# Patient Record
Sex: FEMALE | Race: WHITE | NOT HISPANIC OR LATINO | URBAN - METROPOLITAN AREA
[De-identification: names, ages, dates, MRNs, and addresses within clinical notes are randomized per-mention and may not be internally consistent; named-entity substitution may affect disease eponyms.]

---

## 2023-04-18 ENCOUNTER — APPOINTMENT (OUTPATIENT)
Dept: URBAN - METROPOLITAN AREA CLINIC 204 | Age: 38
Setting detail: DERMATOLOGY
End: 2023-04-18

## 2023-04-18 DIAGNOSIS — L85.3 XEROSIS CUTIS: ICD-10-CM

## 2023-04-18 DIAGNOSIS — L72.0 EPIDERMAL CYST: ICD-10-CM

## 2023-04-18 PROCEDURE — 99203 OFFICE O/P NEW LOW 30 MIN: CPT

## 2023-04-18 PROCEDURE — OTHER ADDITIONAL NOTES: OTHER

## 2023-04-18 PROCEDURE — OTHER MIPS QUALITY: OTHER

## 2023-04-18 PROCEDURE — OTHER MONITORING: OTHER

## 2023-04-18 PROCEDURE — OTHER COUNSELING: OTHER

## 2023-04-18 ASSESSMENT — LOCATION DETAILED DESCRIPTION DERM
LOCATION DETAILED: RIGHT SUPERIOR CENTRAL MALAR CHEEK
LOCATION DETAILED: LEFT SUPERIOR MEDIAL MALAR CHEEK
LOCATION DETAILED: LEFT SUPERIOR CENTRAL MALAR CHEEK
LOCATION DETAILED: RIGHT CENTRAL MALAR CHEEK

## 2023-04-18 ASSESSMENT — LOCATION SIMPLE DESCRIPTION DERM
LOCATION SIMPLE: RIGHT CHEEK
LOCATION SIMPLE: LEFT CHEEK

## 2023-04-18 ASSESSMENT — LOCATION ZONE DERM: LOCATION ZONE: FACE

## 2023-04-18 NOTE — HPI: RASH (ECZEMA)
How Severe Is Your Eczema?: mild
Is This A New Presentation, Or A Follow-Up?: Rash
Additional History: Patient uses Derma-E on face. Patient states flesh colored spots under eyes.

## 2023-04-18 NOTE — PROCEDURE: ADDITIONAL NOTES
Render Risk Assessment In Note?: no
Detail Level: Simple
Additional Notes: Continue to use moisturizer; talked about plastics Dr Schneider for consult there.

## 2023-12-10 ENCOUNTER — HOSPITAL ENCOUNTER (EMERGENCY)
Facility: HOSPITAL | Age: 38
Discharge: HOME | End: 2023-12-10
Attending: EMERGENCY MEDICINE
Payer: COMMERCIAL

## 2023-12-10 VITALS
HEART RATE: 98 BPM | RESPIRATION RATE: 18 BRPM | DIASTOLIC BLOOD PRESSURE: 88 MMHG | BODY MASS INDEX: 34.53 KG/M2 | WEIGHT: 220 LBS | TEMPERATURE: 98.6 F | OXYGEN SATURATION: 98 % | SYSTOLIC BLOOD PRESSURE: 105 MMHG | HEIGHT: 67 IN

## 2023-12-10 DIAGNOSIS — M79.673 PAIN OF FOOT, UNSPECIFIED LATERALITY: Primary | ICD-10-CM

## 2023-12-10 PROCEDURE — 99283 EMERGENCY DEPT VISIT LOW MDM: CPT | Performed by: EMERGENCY MEDICINE

## 2023-12-10 RX ORDER — ETODOLAC 400 MG/1
400 TABLET, FILM COATED ORAL 2 TIMES DAILY
Qty: 14 TABLET | Refills: 0 | Status: SHIPPED | OUTPATIENT
Start: 2023-12-10 | End: 2023-12-17

## 2023-12-10 ASSESSMENT — COLUMBIA-SUICIDE SEVERITY RATING SCALE - C-SSRS
1. IN THE PAST MONTH, HAVE YOU WISHED YOU WERE DEAD OR WISHED YOU COULD GO TO SLEEP AND NOT WAKE UP?: NO
6. HAVE YOU EVER DONE ANYTHING, STARTED TO DO ANYTHING, OR PREPARED TO DO ANYTHING TO END YOUR LIFE?: NO
2. HAVE YOU ACTUALLY HAD ANY THOUGHTS OF KILLING YOURSELF?: NO
6. HAVE YOU EVER DONE ANYTHING, STARTED TO DO ANYTHING, OR PREPARED TO DO ANYTHING TO END YOUR LIFE?: NO

## 2023-12-10 ASSESSMENT — PAIN DESCRIPTION - LOCATION: LOCATION: FOOT

## 2023-12-10 ASSESSMENT — PAIN - FUNCTIONAL ASSESSMENT: PAIN_FUNCTIONAL_ASSESSMENT: 0-10

## 2023-12-10 ASSESSMENT — PAIN SCALES - GENERAL: PAINLEVEL_OUTOF10: 6

## 2023-12-10 ASSESSMENT — PAIN DESCRIPTION - ORIENTATION: ORIENTATION: LEFT

## 2023-12-10 NOTE — ED PROVIDER NOTES
"HPI   Chief Complaint   Patient presents with    Foot Injury     Left foot pain unknown injury started on Friday.  Reports \"hard area\" in arch of foot.        Patient presents to the emergency department secondary to left foot pain.  She is complaining of pain over the inside arch of her left foot.  She states that \"I been doing a lot of driving around town and I think that aggravated it\".  She is concerned that she may have a blood clot in her foot.  She is also reporting proximal left calf pain.  There is no history of any injury of any kind.      History provided by:  Patient   used: No                        [place] Coma Scale Score: 15                  Patient History   Past Medical History:   Diagnosis Date    Personal history of other diseases of the digestive system     History of esophageal ulcer     Past Surgical History:   Procedure Laterality Date    OTHER SURGICAL HISTORY  01/04/2023    No history of surgery     No family history on file.  Social History     Tobacco Use    Smoking status: Former     Types: Cigarettes    Smokeless tobacco: Never   Substance Use Topics    Alcohol use: Not Currently    Drug use: Not Currently       Physical Exam   ED Triage Vitals [12/10/23 0844]   Temp Heart Rate Resp BP   37 °C (98.6 °F) 98 18 105/88      SpO2 Temp src Heart Rate Source Patient Position   98 % -- -- --      BP Location FiO2 (%)     -- --       Physical Exam  Vitals and nursing note reviewed.   Constitutional:       General: She is not in acute distress.     Appearance: Normal appearance. She is normal weight. She is not ill-appearing, toxic-appearing or diaphoretic.   HENT:      Head: Normocephalic and atraumatic.      Nose: Nose normal. No rhinorrhea.   Neck:      Comments: Trachea is midline  Cardiovascular:      Comments: Peripheral pulses are equal and palpable in the bilateral lower extremities.  Musculoskeletal:         General: Tenderness present. No swelling, deformity or " signs of injury. Normal range of motion.      Cervical back: Normal range of motion.      Comments: Patient has mild point tenderness over the medial aspect of the plantar arch of the left foot.  There is no bony deformity.  She can move all digits of both feet without difficulty or deficit.  She can plantarflex and dorsiflex the foot without difficulty.   Skin:     General: Skin is warm and dry.      Findings: No rash.      Comments: Noted scattered tiny varicosities over the bilateral feet and ankles but no engorged, palpable vessels.  No evidence of cellulitis.   Neurological:      General: No focal deficit present.      Mental Status: She is alert and oriented to person, place, and time. Mental status is at baseline.      Sensory: No sensory deficit.      Comments: Full sensation over the bilateral lower extremity dermatomes.   Psychiatric:         Mood and Affect: Mood normal.         Behavior: Behavior normal.         Thought Content: Thought content normal.         Judgment: Judgment normal.         ED Course & MDM   Diagnoses as of 12/10/23 0938   Pain of foot, unspecified laterality       Medical Decision Making  Differential considerations would include sprain, strain, amongst others.  There is no history of trauma therefore I feel acute bony injury is unlikely.  The patient is stating that she is having some discomfort in her left calf therefore we will set the patient up for an ultrasound tomorrow.  Prescription for NSAIDs, instructed to ice the affected area, and follow-up with podiatry.  Return for any other ongoing concerns.        Procedure  Procedures     Mich Sierra DO  12/10/23 0989

## 2025-08-04 ENCOUNTER — OFFICE VISIT (OUTPATIENT)
Dept: URGENT CARE | Facility: CLINIC | Age: 40
End: 2025-08-04
Payer: COMMERCIAL

## 2025-08-04 ENCOUNTER — HOSPITAL ENCOUNTER (OUTPATIENT)
Dept: RADIOLOGY | Facility: CLINIC | Age: 40
Discharge: HOME | End: 2025-08-04
Payer: COMMERCIAL

## 2025-08-04 VITALS
HEIGHT: 71 IN | TEMPERATURE: 97.8 F | BODY MASS INDEX: 30.8 KG/M2 | RESPIRATION RATE: 18 BRPM | OXYGEN SATURATION: 99 % | DIASTOLIC BLOOD PRESSURE: 76 MMHG | WEIGHT: 220 LBS | SYSTOLIC BLOOD PRESSURE: 141 MMHG | HEART RATE: 110 BPM

## 2025-08-04 DIAGNOSIS — R19.7 ACUTE DIARRHEA: ICD-10-CM

## 2025-08-04 DIAGNOSIS — K21.9 GASTROESOPHAGEAL REFLUX DISEASE, UNSPECIFIED WHETHER ESOPHAGITIS PRESENT: ICD-10-CM

## 2025-08-04 DIAGNOSIS — R10.9 FLANK PAIN, ACUTE: Primary | ICD-10-CM

## 2025-08-04 DIAGNOSIS — R10.9 FLANK PAIN, ACUTE: ICD-10-CM

## 2025-08-04 LAB
POC APPEARANCE, URINE: CLEAR
POC BILIRUBIN, URINE: NEGATIVE
POC BLOOD, URINE: ABNORMAL
POC COLOR, URINE: YELLOW
POC GLUCOSE, URINE: NEGATIVE MG/DL
POC KETONES, URINE: ABNORMAL MG/DL
POC LEUKOCYTES, URINE: NEGATIVE
POC NITRITE,URINE: NEGATIVE
POC PH, URINE: 6.5 PH
POC PROTEIN, URINE: NEGATIVE MG/DL
POC SPECIFIC GRAVITY, URINE: 1.01
POC UROBILINOGEN, URINE: 0.2 EU/DL

## 2025-08-04 PROCEDURE — 76770 US EXAM ABDO BACK WALL COMP: CPT

## 2025-08-04 PROCEDURE — 81002 URINALYSIS NONAUTO W/O SCOPE: CPT | Performed by: PHYSICIAN ASSISTANT

## 2025-08-04 PROCEDURE — 76770 US EXAM ABDO BACK WALL COMP: CPT | Performed by: STUDENT IN AN ORGANIZED HEALTH CARE EDUCATION/TRAINING PROGRAM

## 2025-08-04 PROCEDURE — 99213 OFFICE O/P EST LOW 20 MIN: CPT | Performed by: PHYSICIAN ASSISTANT

## 2025-08-04 NOTE — PROGRESS NOTES
[facility] URGENT CARE   GERARDO NOTE:      Name: Oliva Castañeda, 39 y.o.    CSN:[id number]   MRN:[id number]    PCP: No Assigned PCP Generic Provider, MD    ALL:  Allergies[1]    History:    Chief Complaint: Flank Pain (Pt states kidney pain and also had GERD,, symptoms have been on set for 1 week.  )    Encounter Date: 8/4/2025  12:40    At the beginning of the encounter, I introduced myself to the patient as a Physician Assistant in the urgent care setting, ensuring they understood my role in their care and establishing rapport.      HPI: The history was obtained from the patient. Oliva is a 39 y.o. female, who presents with a chief complaint of Flank Pain (Pt states kidney pain and also had GERD,, symptoms have been on set for 1 week.  )     Oliva reports persistent, dull aching pain localized to the left flank, radiating toward the left lumbar/colonic region. She denies any exacerbation of pain with trunk range of motion, lifting, or performing work-related activities, suggesting a non-musculoskeletal origin. She even had her  attempted a squeezing/massage sort of maneuver to see if there was a rib out of place, this did not relieve her symptoms.     She has not experienced fever, chills, nausea, or vomiting but does report a recent episode of watery diarrhea within the past 3-4 days.    Her last oral intake was on August 3, 2025, around 1500 hours. She is currently fasting and may be experiencing some effects of decreased intake.    Past medical history is significant for GERD, originally attributed to an esophageal food impaction involving Swiss chard, which required EGD for removal. She notes recurrent GERD symptoms since that event, sometimes managed with cabbage broth and dietary modification, though these measures have not recently relieved her current symptoms. Recently she's been having a metallic taste in her mouth the onset x1 week.    She denies midline back pain  or saddle anesthesia, but does endorse nonspecific bilateral gluteal and hamstring discomfort, which she reports is worse when lying supine.    Her last menstrual period was July 26, 2025.    PMHx:    Medical History[2]         Current Medications[3]      PMSx:  Surgical History[4]    Fam Hx: Family History[5]    SOC. Hx:     Social History     Socioeconomic History    Marital status:      Spouse name: Not on file    Number of children: Not on file    Years of education: Not on file    Highest education level: Not on file   Occupational History    Not on file   Tobacco Use    Smoking status: Former     Types: Cigarettes    Smokeless tobacco: Never   Substance and Sexual Activity    Alcohol use: Not Currently    Drug use: Not Currently    Sexual activity: Not on file   Other Topics Concern    Not on file   Social History Narrative    Not on file     Social Drivers of Health     Financial Resource Strain: Low Risk  (2/5/2024)    Received from [facility]    Overall Financial Resource Strain (CARDIA)     Difficulty of Paying Living Expenses: Not very hard   Food Insecurity: Food Insecurity Present (2/5/2024)    Received from [facility]    Hunger Vital Sign     Within the past 12 months, you worried that your food would run out before you got the money to buy more.: Sometimes true     Within the past 12 months, the food you bought just didn't last and you didn't have money to get more.: Never true   Transportation Needs: No Transportation Needs (2/5/2024)    Received from [facility]    PRAPARE - Transportation     Lack of Transportation (Medical): No     Lack of Transportation (Non-Medical): No   Physical Activity: Sufficiently Active (2/5/2024)    Received from [facility]    Exercise Vital Sign     On average, how many days per week do you engage in moderate to strenuous exercise (like a brisk walk)?: 4 days     On average, how many minutes do you engage in exercise at this level?: 40 min    Stress: No Stress Concern Present (2/5/2024)    Received from [facility]    [facility] [employer] [facility] [employer] [facility] - Occupational Stress Questionnaire     Feeling of Stress : Not at all   Social Connections: Moderately Integrated (2/5/2024)    Received from [facility]    Social Connection and Isolation Panel     In a typical week, how many times do you talk on the phone with family, friends, or neighbors?: More than three times a week     How often do you get together with friends or relatives?: Once a week     How often do you attend Methodist or Judaism services?: More than 4 times per year     Do you belong to any clubs or organizations such as Methodist groups, unions, fraternal or athletic groups, or school groups?: No     How often do you attend meetings of the clubs or organizations you belong to?: Never     Are you , , , , never , or living with a partner?:    Intimate Partner Violence: Not At Risk (2/5/2024)    Received from [facility]    Humiliation, Afraid, Rape, and Kick questionnaire     Within the last year, have you been afraid of your partner or ex-partner?: No     Within the last year, have you been humiliated or emotionally abused in other ways by your partner or ex-partner?: No     Within the last year, have you been kicked, hit, slapped, or otherwise physically hurt by your partner or ex-partner?: No     Within the last year, have you been raped or forced to have any kind of sexual activity by your partner or ex-partner?: No   Housing Stability: Low Risk  (2/5/2024)    Received from [facility]    Housing Stability Vital Sign     Unable to Pay for Housing in the Last Year: No     Number of Places Lived in the Last Year: 1     Unstable Housing in the Last Year: No         Vitals:    08/04/25 1212   BP: 141/76   Pulse: 110   Resp: 18   Temp: 36.6 °C (97.8 °F)   SpO2: 99%     99.8 kg (220 lb)          Physical Exam  Vitals reviewed.    Constitutional:       Appearance: Normal appearance.   HENT:      Head: Normocephalic and atraumatic.      Right Ear: Hearing, tympanic membrane, ear canal and external ear normal.      Left Ear: Hearing, tympanic membrane, ear canal and external ear normal.      Nose: Nose normal.      Mouth/Throat:      Mouth: Mucous membranes are moist. No oral lesions.      Dentition: No dental tenderness, gingival swelling or gum lesions.      Tongue: No lesions.      Palate: No mass.      Pharynx: No pharyngeal swelling.     Eyes:      Extraocular Movements: Extraocular movements intact.      Pupils: Pupils are equal, round, and reactive to light.     Pulmonary:      Effort: Pulmonary effort is normal.   Abdominal:      General: Abdomen is flat.      Palpations: Abdomen is soft.      Tenderness: There is abdominal tenderness in the periumbilical area, left upper quadrant and left lower quadrant. There is left CVA tenderness. There is no right CVA tenderness. Positive signs include psoas sign (left sided).      Hernia: No hernia is present.      Comments: No pain on bed shake         Musculoskeletal:      Cervical back: Normal range of motion.     Skin:     General: Skin is warm and dry.      Capillary Refill: Capillary refill takes less than 2 seconds.      Findings: No bruising, ecchymosis, rash or wound.     Neurological:      Mental Status: She is alert.           LABORATORY @ RADIOLOGICAL IMAGING (if done):   Interpreted By:  Andres Mckenzie,   STUDY:   RENAL COMPLETE; 8/4/2025 1:35 pm      INDICATION:  Signs/Symptoms:left sided flank pain, hematuria.      COMPARISON:  CT abdomen and pelvis 12/17/2021      ACCESSION NUMBER(S):  [id number]      ORDERING CLINICIAN:  PORSHA WEINBERG      TECHNIQUE:  Sonography of the kidneys and urinary bladder was performed.      FINDINGS:      Right Kidney:  Renal length: 9 cm  Parenchyma: Normal parenchymal echogenicity. Normal parenchymal  thickness. Collecting system: No  hydronephrosis.  Calculus: No echogenic, shadowing calculus.  Lesion: None.      Left Kidney:  Renal length: 10 cm  Parenchyma: Normal parenchymal echogenicity. Normal parenchymal  thickness. Collecting system: No hydronephrosis.  Calculus: No echogenic, shadowing calculus.  Lesion: None.      Bladder: Normal sonographic appearance. Bilateral ureteral jets  present.      Other: 1.7 round nodule with internal vascularity lateral to the left  midpole kidney corresponds to a splenule shown on the prior CT.      IMPRESSION:      No hydronephrosis.      MACRO:  None      Signed by: Andres Mckenzie 8/4/2025 3:02 PM  Dictation workstation:   [id number]    Results for orders placed or performed in visit on 08/04/25 (from the past 24 hours)   POCT UA (nonautomated w/o microscopy) manually resulted   Result Value Ref Range    POC Color, Urine Yellow Straw, Yellow, Light-Yellow    POC Appearance, Urine Clear Clear    POC Glucose, Urine NEGATIVE NEGATIVE mg/dl    POC Bilirubin, Urine NEGATIVE NEGATIVE    POC Ketones, Urine 15 (1+) (A) NEGATIVE mg/dl    POC Specific Gravity, Urine 1.015 1.005 - 1.035    POC Blood, Urine TRACE-Intact (A) NEGATIVE    POC PH, Urine 6.5 No Reference Range Established PH    POC Protein, Urine NEGATIVE NEGATIVE mg/dl    POC Urobilinogen, Urine 0.2 0.2, 1.0 EU/DL    Poc Nitrite, Urine NEGATIVE NEGATIVE    POC Leukocytes, Urine NEGATIVE NEGATIVE     UA reveals ketones & some trace-intact blood (will send for urinalysis)  ____________________________________________________________________    I did personally review Oliva's past medical history, surgical history, social history, as well as family history (when relevant).  In this case, I also oversaw the her drug management by reviewing her medication list, allergy list, as well as the medications that I prescribed during the UC course and/or recommended as an out-patient (including possible OTC medications such as acetaminophen, NSAIDs ,  etc).    After reviewing the items above, I did look at previous medical documentation, such as recent hospitalizations, office visits, and/or recent consultations with PCP/specialist.                          SDOH:   Another factor that I considered in Oliva's care was her Social Determinants of Health (SDOH). During this UC encounter, she did not have social determinants of health. Those SDOH influencing Oliva's care are: none      _____________________________________________________________________      UC COURSE/MEDICAL DECISION MAKING:    Oliva is a 39 y.o., who presents with a working diagnosis of   1. Flank pain, acute    2. Acute diarrhea    3. Gastroesophageal reflux disease, unspecified whether esophagitis present     with a differential to include:     Current plan will be to provide supportive care, based on our conversation, Oliva does avoid pharmaceutical treatments if possible, I recommend low residue diet & continue with simple non-caffeinated liquids.   I have ordered a number of tests to assess the GERD complaints (including H pylori Urea breathe test), a GI stool sample (to return to office once completed), and imaging here today, which did not reveal any acute findings with her kidneys/bladder to suggest hydronephrosis or source of a renal source for her symptoms.   UA was inconclusive with ketones and mild microscopic blood, will send for computed cell count. Lastly, if necessary I explained imaging with CT can be arranged and at any time sheis exhibiting a fever, N/V or the diarrhea becomes worse, second opinion/ER evaluation is always available.     Cedric Macias PA-C   Advanced Practice Provider  [facility] URGENT CARE    Please note: While the patient may or may not have received printed discharge paperwork, all relevant medical findings, test results, and treatment details are accessible through the electronic medical record system. The patient is  encouraged to review their chart via the patient portal for comprehensive information and follow-up instructions.         [1]   Allergies  Allergen Reactions    Penicillins Hives, Rash and Swelling    Cephalosporins Hives    Clindamycin Diarrhea and Hives    Sulfa (Sulfonamide Antibiotics) Hives    Doxycycline Rash   [2]   Past Medical History:  Diagnosis Date    Personal history of other diseases of the digestive system     History of esophageal ulcer   [3]   No current outpatient medications on file.     No current facility-administered medications for this visit.   [4]   Past Surgical History:  Procedure Laterality Date    OTHER SURGICAL HISTORY  01/04/2023    No history of surgery   [5] No family history on file.

## 2025-08-05 ENCOUNTER — RESULTS FOLLOW-UP (OUTPATIENT)
Dept: URGENT CARE | Facility: CLINIC | Age: 40
End: 2025-08-05
Payer: COMMERCIAL

## 2025-08-05 DIAGNOSIS — R10.9 LEFT FLANK PAIN: ICD-10-CM

## 2025-08-05 DIAGNOSIS — K21.9 CHRONIC GERD: Primary | ICD-10-CM

## 2025-08-05 LAB
ALBUMIN SERPL-MCNC: 4.5 G/DL (ref 3.6–5.1)
ALP SERPL-CCNC: 43 U/L (ref 31–125)
ALT SERPL-CCNC: 14 U/L (ref 6–29)
ANION GAP SERPL CALCULATED.4IONS-SCNC: 9 MMOL/L (CALC) (ref 7–17)
APPEARANCE UR: CLEAR
AST SERPL-CCNC: 17 U/L (ref 10–30)
BASOPHILS # BLD AUTO: 37 CELLS/UL (ref 0–200)
BASOPHILS NFR BLD AUTO: 0.6 %
BILIRUB SERPL-MCNC: 0.5 MG/DL (ref 0.2–1.2)
BILIRUB UR QL STRIP: NEGATIVE
BUN SERPL-MCNC: 8 MG/DL (ref 7–25)
CALCIUM SERPL-MCNC: 9.2 MG/DL (ref 8.6–10.2)
CHLORIDE SERPL-SCNC: 103 MMOL/L (ref 98–110)
CO2 SERPL-SCNC: 26 MMOL/L (ref 20–32)
COLOR UR: YELLOW
CREAT SERPL-MCNC: 0.82 MG/DL (ref 0.5–0.99)
CRP SERPL-MCNC: <3 MG/L
EGFRCR SERPLBLD CKD-EPI 2021: 92 ML/MIN/1.73M2
EOSINOPHIL # BLD AUTO: 50 CELLS/UL (ref 15–500)
EOSINOPHIL NFR BLD AUTO: 0.8 %
ERYTHROCYTE [DISTWIDTH] IN BLOOD BY AUTOMATED COUNT: 13.4 % (ref 11–15)
ERYTHROCYTE [SEDIMENTATION RATE] IN BLOOD BY WESTERGREN METHOD: 6 MM/H
GLUCOSE SERPL-MCNC: 85 MG/DL (ref 65–99)
GLUCOSE UR QL STRIP: NEGATIVE
HCT VFR BLD AUTO: 41.5 % (ref 35–45)
HGB BLD-MCNC: 14.1 G/DL (ref 11.7–15.5)
HGB UR QL STRIP: NEGATIVE
KETONES UR QL STRIP: ABNORMAL
LEUKOCYTE ESTERASE UR QL STRIP: NEGATIVE
LIPASE SERPL-CCNC: 17 U/L (ref 7–60)
LYMPHOCYTES # BLD AUTO: 1631 CELLS/UL (ref 850–3900)
LYMPHOCYTES NFR BLD AUTO: 26.3 %
MCH RBC QN AUTO: 29.3 PG (ref 27–33)
MCHC RBC AUTO-ENTMCNC: 34 G/DL (ref 32–36)
MCV RBC AUTO: 86.3 FL (ref 80–100)
MONOCYTES # BLD AUTO: 397 CELLS/UL (ref 200–950)
MONOCYTES NFR BLD AUTO: 6.4 %
NEUTROPHILS # BLD AUTO: 4086 CELLS/UL (ref 1500–7800)
NEUTROPHILS NFR BLD AUTO: 65.9 %
NITRITE UR QL STRIP: NEGATIVE
PH UR STRIP: 6.5 [PH] (ref 5–8)
PLATELET # BLD AUTO: 337 THOUSAND/UL (ref 140–400)
PMV BLD REES-ECKER: 9.6 FL (ref 7.5–12.5)
POTASSIUM SERPL-SCNC: 4 MMOL/L (ref 3.5–5.3)
PROT SERPL-MCNC: 6.9 G/DL (ref 6.1–8.1)
PROT UR QL STRIP: NEGATIVE
RBC # BLD AUTO: 4.81 MILLION/UL (ref 3.8–5.1)
SODIUM SERPL-SCNC: 138 MMOL/L (ref 135–146)
SP GR UR STRIP: 1.01 (ref 1–1.03)
UREA BREATH TEST QL: NOT DETECTED
WBC # BLD AUTO: 6.2 THOUSAND/UL (ref 3.8–10.8)

## 2025-08-05 NOTE — TELEPHONE ENCOUNTER
[facility] URGENT CARE Telephone NOTE:    Name: Oliva Castañeda, 39 y.o.    CSN:[id number]   MRN:[id number]    PCP: No Assigned PCP Generic Provider, MD  ALL:  Allergies[1]      Date of call: 08/05/25  Time of Call: 2:23 PM    Connection was: made    Spoke with: Patient      Purpose:  discuss lab results    Details:   Diagnostic tests were reviewed and questions answered. Diagnosis, care plan and treatment options were discussed. The patient understand instructions and will follow up as directed.    Still in pain, will proceed with CT imaging.         [1]   Allergies  Allergen Reactions    Penicillins Hives, Rash and Swelling    Cephalosporins Hives    Clindamycin Diarrhea and Hives    Sulfa (Sulfonamide Antibiotics) Hives    Doxycycline Rash

## 2025-08-07 ENCOUNTER — TELEPHONE (OUTPATIENT)
Dept: URGENT CARE | Facility: CLINIC | Age: 40
End: 2025-08-07
Payer: COMMERCIAL

## 2025-08-07 NOTE — TELEPHONE ENCOUNTER
Incoming call from pt regarding CT ordered on 08/04 visit. Scheduled with Con on 08/15, but pt asking if anything available sooner. Contacted Allison [employer] at [employer], was able to reschedule for 08/08 at 10:00 AM. Pt notified

## 2025-08-08 ENCOUNTER — HOSPITAL ENCOUNTER (OUTPATIENT)
Dept: RADIOLOGY | Facility: HOSPITAL | Age: 40
Discharge: HOME | End: 2025-08-08
Payer: COMMERCIAL

## 2025-08-08 DIAGNOSIS — R10.9 LEFT FLANK PAIN: ICD-10-CM

## 2025-08-08 DIAGNOSIS — K21.9 CHRONIC GERD: ICD-10-CM

## 2025-08-08 PROCEDURE — 2550000001 HC RX 255 CONTRASTS: Performed by: PHYSICIAN ASSISTANT

## 2025-08-08 PROCEDURE — 74177 CT ABD & PELVIS W/CONTRAST: CPT | Performed by: RADIOLOGY

## 2025-08-08 PROCEDURE — 74177 CT ABD & PELVIS W/CONTRAST: CPT

## 2025-08-08 RX ADMIN — IOHEXOL 70 ML: 350 INJECTION, SOLUTION INTRAVENOUS at 12:03

## 2025-08-09 DIAGNOSIS — N32.89 BLADDER WALL THICKENING: Primary | ICD-10-CM

## 2025-08-09 RX ORDER — NITROFURANTOIN 25; 75 MG/1; MG/1
100 CAPSULE ORAL 2 TIMES DAILY
Qty: 14 CAPSULE | Refills: 0 | Status: SHIPPED | OUTPATIENT
Start: 2025-08-09 | End: 2025-08-16

## 2025-08-09 NOTE — PROGRESS NOTES
[facility] URGENT CARE Telephone NOTE:    Name: Oliva Castañeda, 39 y.o.    CSN:[id number]   MRN:[id number]    PCP: No Assigned PCP Generic Provider, MD  ALL:  Allergies[1]      Date of call: 08/09/25  Time of Call: 1:50 PM    Connection was: made    Spoke with: Patient      Purpose:  discuss lab results    Details:   Diagnostic tests were reviewed and questions answered. Diagnosis, care plan and treatment options were discussed. The patient understand instructions and will follow up as directed. Rx Macrobid & reassess progress; likely referral to URO or GI given progress.               [1]   Allergies  Allergen Reactions    Penicillins Hives, Rash and Swelling    Cephalosporins Hives    Clindamycin Diarrhea and Hives    Sulfa (Sulfonamide Antibiotics) Hives    Doxycycline Rash

## 2025-08-15 ENCOUNTER — APPOINTMENT (OUTPATIENT)
Dept: RADIOLOGY | Facility: HOSPITAL | Age: 40
End: 2025-08-15
Payer: COMMERCIAL

## 2025-09-03 ENCOUNTER — APPOINTMENT (OUTPATIENT)
Dept: PRIMARY CARE | Facility: CLINIC | Age: 40
End: 2025-09-03
Payer: COMMERCIAL